# Patient Record
Sex: MALE | Race: WHITE | NOT HISPANIC OR LATINO | URBAN - METROPOLITAN AREA
[De-identification: names, ages, dates, MRNs, and addresses within clinical notes are randomized per-mention and may not be internally consistent; named-entity substitution may affect disease eponyms.]

---

## 2021-09-09 ENCOUNTER — OFFICE VISIT (OUTPATIENT)
Dept: URBAN - METROPOLITAN AREA CLINIC 70 | Facility: CLINIC | Age: 61
End: 2021-09-09
Payer: COMMERCIAL

## 2021-09-09 ENCOUNTER — LAB OUTSIDE AN ENCOUNTER (OUTPATIENT)
Dept: URBAN - METROPOLITAN AREA CLINIC 70 | Facility: CLINIC | Age: 61
End: 2021-09-09

## 2021-09-09 ENCOUNTER — WEB ENCOUNTER (OUTPATIENT)
Dept: URBAN - METROPOLITAN AREA CLINIC 70 | Facility: CLINIC | Age: 61
End: 2021-09-09

## 2021-09-09 DIAGNOSIS — Z12.11 COLON CANCER SCREENING: ICD-10-CM

## 2021-09-09 PROCEDURE — 99203 OFFICE O/P NEW LOW 30 MIN: CPT | Performed by: INTERNAL MEDICINE

## 2021-09-09 RX ORDER — TESTOSTERONE CYPIONATE 200 MG/ML
INJECT 1 2 (ONE HALF) ML (CC) INTRAMUSCULARLY EVERY 10 DAYS FOR LOW TESTOSTERONE INJECTION, SOLUTION INTRAMUSCULAR
Qty: 10 | Refills: 0 | Status: ACTIVE | COMMUNITY

## 2021-09-09 RX ORDER — ATORVASTATIN CALCIUM 80 MG/1
TABLET, FILM COATED ORAL
Qty: 90 | Status: ACTIVE | COMMUNITY

## 2021-09-09 RX ORDER — HYDROCHLOROTHIAZIDE 12.5 MG/1
CAPSULE ORAL
Qty: 90 | Status: ACTIVE | COMMUNITY

## 2021-09-09 RX ORDER — ATORVASTATIN CALCIUM 80 MG/1
1 TABLET TABLET, FILM COATED ORAL ONCE A DAY
Status: ACTIVE | COMMUNITY

## 2021-09-09 RX ORDER — ZOLPIDEM TARTRATE 10 MG/1
TABLET ORAL
Qty: 90 | Status: ACTIVE | COMMUNITY

## 2021-09-09 NOTE — HPI-TODAY'S VISIT:
Patient presents for colon cancer screening. Last colonoscopy was done 2011 with findings of polyp (hyperplastic). No family history of colon cancer. Patient denies any abdominal pain, diarrhea, constipation, weight loss, or rectal bleeding.

## 2021-12-03 ENCOUNTER — OFFICE VISIT (OUTPATIENT)
Dept: URBAN - METROPOLITAN AREA SURGERY CENTER 24 | Facility: SURGERY CENTER | Age: 61
End: 2021-12-03

## 2022-07-11 ENCOUNTER — OFFICE VISIT (OUTPATIENT)
Dept: URBAN - METROPOLITAN AREA TELEHEALTH 2 | Facility: TELEHEALTH | Age: 62
End: 2022-07-11
Payer: COMMERCIAL

## 2022-07-11 ENCOUNTER — LAB OUTSIDE AN ENCOUNTER (OUTPATIENT)
Dept: URBAN - METROPOLITAN AREA TELEHEALTH 2 | Facility: TELEHEALTH | Age: 62
End: 2022-07-11

## 2022-07-11 VITALS — HEIGHT: 72 IN | BODY MASS INDEX: 25.73 KG/M2 | WEIGHT: 190 LBS

## 2022-07-11 DIAGNOSIS — Z12.11 COLON CANCER SCREENING: ICD-10-CM

## 2022-07-11 PROCEDURE — 99442 PHONE E/M BY PHYS 11-20 MIN: CPT | Performed by: INTERNAL MEDICINE

## 2022-07-11 RX ORDER — HYDROCHLOROTHIAZIDE 12.5 MG/1
CAPSULE ORAL
Qty: 90 | Status: ACTIVE | COMMUNITY

## 2022-07-11 RX ORDER — ATORVASTATIN CALCIUM 80 MG/1
1 TABLET TABLET, FILM COATED ORAL ONCE A DAY
Status: ACTIVE | COMMUNITY

## 2022-07-11 RX ORDER — ATORVASTATIN CALCIUM 80 MG/1
TABLET, FILM COATED ORAL
Qty: 90 | Status: ACTIVE | COMMUNITY

## 2022-07-11 RX ORDER — ZOLPIDEM TARTRATE 10 MG/1
TABLET ORAL
Qty: 90 | Status: ACTIVE | COMMUNITY

## 2022-07-11 RX ORDER — TESTOSTERONE CYPIONATE 200 MG/ML
INJECT 1 2 (ONE HALF) ML (CC) INTRAMUSCULARLY EVERY 10 DAYS FOR LOW TESTOSTERONE INJECTION, SOLUTION INTRAMUSCULAR
Qty: 10 | Refills: 0 | Status: ACTIVE | COMMUNITY

## 2022-07-11 NOTE — HPI-TODAY'S VISIT:
OV 9/9/21: Patient presents for colon cancer screening. Last colonoscopy was done 2011 with findings of polyp (hyperplastic). No family history of colon cancer. Patient denies any abdominal pain, diarrhea, constipation, weight loss, or rectal bleeding. ----------------------------------------------------------------------------- Today 7/11/22: Patient presents today to get screening colonoscopy rescheduled. At last OV, procedure was scheduled but had to be cancelled due to insurance issues. No changes in medical condition since last OV per patient. Currently w/o GI complaints or concerns. Denies wt loss, abdominal pain, N/V, diarrhea, constipation, or rectal bleeding.

## 2022-10-31 ENCOUNTER — OFFICE VISIT (OUTPATIENT)
Dept: URBAN - METROPOLITAN AREA SURGERY CENTER 24 | Facility: SURGERY CENTER | Age: 62
End: 2022-10-31
Payer: COMMERCIAL

## 2022-10-31 DIAGNOSIS — Z12.11 COLON CANCER SCREENING: ICD-10-CM

## 2022-10-31 DIAGNOSIS — D12.2 ADENOMA OF ASCENDING COLON: ICD-10-CM

## 2022-10-31 PROCEDURE — 45385 COLONOSCOPY W/LESION REMOVAL: CPT | Performed by: INTERNAL MEDICINE

## 2022-10-31 PROCEDURE — G8907 PT DOC NO EVENTS ON DISCHARG: HCPCS | Performed by: INTERNAL MEDICINE

## 2024-01-10 ENCOUNTER — OFFICE VISIT (OUTPATIENT)
Dept: URBAN - METROPOLITAN AREA CLINIC 70 | Facility: CLINIC | Age: 64
End: 2024-01-10

## 2024-01-17 ENCOUNTER — OFFICE VISIT (OUTPATIENT)
Dept: URBAN - METROPOLITAN AREA CLINIC 70 | Facility: CLINIC | Age: 64
End: 2024-01-17

## 2024-01-17 RX ORDER — HYDROCHLOROTHIAZIDE 12.5 MG/1
CAPSULE ORAL
Qty: 90 | Status: ACTIVE | COMMUNITY

## 2024-01-17 RX ORDER — ATORVASTATIN CALCIUM 80 MG/1
TABLET, FILM COATED ORAL
Qty: 90 | Status: ACTIVE | COMMUNITY

## 2024-01-17 RX ORDER — ATORVASTATIN CALCIUM 80 MG/1
1 TABLET TABLET, FILM COATED ORAL ONCE A DAY
Status: ACTIVE | COMMUNITY

## 2024-01-17 RX ORDER — ZOLPIDEM TARTRATE 10 MG/1
TABLET ORAL
Qty: 90 | Status: ACTIVE | COMMUNITY

## 2024-01-17 RX ORDER — TESTOSTERONE CYPIONATE 200 MG/ML
INJECT 1 2 (ONE HALF) ML (CC) INTRAMUSCULARLY EVERY 10 DAYS FOR LOW TESTOSTERONE INJECTION, SOLUTION INTRAMUSCULAR
Qty: 10 | Refills: 0 | Status: ACTIVE | COMMUNITY

## 2024-01-22 ENCOUNTER — TELEPHONE ENCOUNTER (OUTPATIENT)
Dept: URBAN - METROPOLITAN AREA CLINIC 70 | Facility: CLINIC | Age: 64
End: 2024-01-22

## 2024-02-23 ENCOUNTER — LAB (OUTPATIENT)
Dept: URBAN - METROPOLITAN AREA CLINIC 70 | Facility: CLINIC | Age: 64
End: 2024-02-23

## 2024-02-23 ENCOUNTER — OV EP (OUTPATIENT)
Dept: URBAN - METROPOLITAN AREA CLINIC 70 | Facility: CLINIC | Age: 64
End: 2024-02-23
Payer: COMMERCIAL

## 2024-02-23 VITALS
SYSTOLIC BLOOD PRESSURE: 163 MMHG | HEIGHT: 72 IN | DIASTOLIC BLOOD PRESSURE: 103 MMHG | WEIGHT: 208.8 LBS | HEART RATE: 80 BPM | TEMPERATURE: 98.2 F | BODY MASS INDEX: 28.28 KG/M2

## 2024-02-23 DIAGNOSIS — R10.13 EPIGASTRIC PAIN: ICD-10-CM

## 2024-02-23 DIAGNOSIS — R06.09 DYSPNEA ON EXERTION: ICD-10-CM

## 2024-02-23 DIAGNOSIS — Z86.010 HISTORY OF COLON POLYPS: ICD-10-CM

## 2024-02-23 PROBLEM — 428283002: Status: ACTIVE | Noted: 2024-02-23

## 2024-02-23 PROCEDURE — 99214 OFFICE O/P EST MOD 30 MIN: CPT | Performed by: NURSE PRACTITIONER

## 2024-02-23 RX ORDER — ATORVASTATIN CALCIUM 80 MG/1
1 TABLET TABLET, FILM COATED ORAL ONCE A DAY
Status: ACTIVE | COMMUNITY

## 2024-02-23 RX ORDER — TESTOSTERONE CYPIONATE 200 MG/ML
INJECT 1 2 (ONE HALF) ML (CC) INTRAMUSCULARLY EVERY 10 DAYS FOR LOW TESTOSTERONE INJECTION, SOLUTION INTRAMUSCULAR
Qty: 10 | Refills: 0 | Status: ACTIVE | COMMUNITY

## 2024-02-23 RX ORDER — LISINOPRIL 10 MG/1
1 TABLET TABLET ORAL ONCE A DAY
Status: ACTIVE | COMMUNITY

## 2024-02-23 RX ORDER — ZOLPIDEM TARTRATE 10 MG/1
TABLET ORAL
Qty: 90 | Status: DISCONTINUED | COMMUNITY

## 2024-02-23 RX ORDER — PANTOPRAZOLE SODIUM 40 MG/1
1 TABLET TABLET, DELAYED RELEASE ORAL TWICE A DAY
Qty: 60 TABLET | Refills: 5

## 2024-02-23 RX ORDER — ATORVASTATIN CALCIUM 80 MG/1
TABLET, FILM COATED ORAL
Qty: 90 | Status: DISCONTINUED | COMMUNITY

## 2024-02-23 RX ORDER — HYDROCHLOROTHIAZIDE 12.5 MG/1
CAPSULE ORAL
Qty: 90 | Status: ACTIVE | COMMUNITY

## 2024-02-23 RX ORDER — PANTOPRAZOLE SODIUM 40 MG/1
1 TABLET TABLET, DELAYED RELEASE ORAL ONCE A DAY
Status: ACTIVE | COMMUNITY

## 2024-02-23 NOTE — HPI-TODAY'S VISIT:
Patient is a 64 y/o male who is previously known to our group due to hx of colon polyps with last colonoscopy  10/31/22 with findings of polyp (TA) with recall in 5 years. Today he presents for evaluation of SOB and abdominal pain. He reports recently developing SOB with minimal exertion and is worse with bending over. He has associated substernal/epigastric constant pain described as "tightness". He was started on PPI w/o improvement. He has been evaluated by cardiology ([facility]) with workup negative and is currently undergoing workup with pulmonary (Dr. Andino) with CT pending this am. No prior EGD. No NSAIDs. He was referred to GI to see if etiology was 2/2 GERD. Denies fever, wt loss, N/V, dysphagia, constipation, diarrhea, or signs of GI bleeding.

## 2024-03-25 ENCOUNTER — OV EP (OUTPATIENT)
Dept: URBAN - METROPOLITAN AREA CLINIC 70 | Facility: CLINIC | Age: 64
End: 2024-03-25
Payer: COMMERCIAL

## 2024-03-25 ENCOUNTER — LAB (OUTPATIENT)
Dept: URBAN - METROPOLITAN AREA CLINIC 70 | Facility: CLINIC | Age: 64
End: 2024-03-25

## 2024-03-25 VITALS
TEMPERATURE: 97.6 F | WEIGHT: 209.4 LBS | BODY MASS INDEX: 28.36 KG/M2 | HEART RATE: 89 BPM | SYSTOLIC BLOOD PRESSURE: 138 MMHG | DIASTOLIC BLOOD PRESSURE: 91 MMHG | HEIGHT: 72 IN

## 2024-03-25 DIAGNOSIS — R10.13 EPIGASTRIC PAIN: ICD-10-CM

## 2024-03-25 DIAGNOSIS — Z86.010 HISTORY OF COLON POLYPS: ICD-10-CM

## 2024-03-25 DIAGNOSIS — R06.09 DYSPNEA ON EXERTION: ICD-10-CM

## 2024-03-25 PROCEDURE — 99214 OFFICE O/P EST MOD 30 MIN: CPT | Performed by: NURSE PRACTITIONER

## 2024-03-25 RX ORDER — PANTOPRAZOLE SODIUM 40 MG/1
1 TABLET TABLET, DELAYED RELEASE ORAL TWICE A DAY
Qty: 60 TABLET | Refills: 5 | Status: ACTIVE | COMMUNITY

## 2024-03-25 RX ORDER — LISINOPRIL 10 MG/1
1 TABLET TABLET ORAL ONCE A DAY
Status: ACTIVE | COMMUNITY

## 2024-03-25 RX ORDER — HYDROCHLOROTHIAZIDE 12.5 MG/1
CAPSULE ORAL
Qty: 90 | Status: ON HOLD | COMMUNITY

## 2024-03-25 RX ORDER — PANTOPRAZOLE SODIUM 40 MG/1
1 TABLET TABLET, DELAYED RELEASE ORAL TWICE A DAY
Qty: 60 TABLET | Refills: 5 | OUTPATIENT

## 2024-03-25 RX ORDER — ATORVASTATIN CALCIUM 80 MG/1
1 TABLET TABLET, FILM COATED ORAL ONCE A DAY
Status: ACTIVE | COMMUNITY

## 2024-03-25 RX ORDER — TESTOSTERONE CYPIONATE 200 MG/ML
INJECT 1 2 (ONE HALF) ML (CC) INTRAMUSCULARLY EVERY 10 DAYS FOR LOW TESTOSTERONE INJECTION, SOLUTION INTRAMUSCULAR
Qty: 10 | Refills: 0 | Status: ON HOLD | COMMUNITY

## 2024-03-25 NOTE — HPI-TODAY'S VISIT:
OV 2/23/24: Patient is a 64 y/o male who is previously known to our group due to hx of colon polyps with last colonoscopy  10/31/22 with findings of polyp (TA) with recall in 5 years. Today he presents for evaluation of SOB and abdominal pain. He reports recently developing SOB with minimal exertion and is worse with bending over. He has associated substernal/epigastric constant pain described as "tightness". He was started on PPI w/o improvement. He has been evaluated by cardiology ([facility]) with workup negative and is currently undergoing workup with pulmonary (Dr. Andino) with CT pending this am. No prior EGD. No NSAIDs. He was referred to GI to see if etiology was 2/2 GERD. Denies fever, wt loss, N/V, dysphagia, constipation, diarrhea, or signs of GI bleeding. ---------------------- Today 3/25/24: Patient presents today for follow up. He has been on high dose PPI since last OV. Undewent CT of chest/abdomen/pelvis 2/23/24 by pulmonary with negative results. Pulmonary workup negative. Admits to high stress due to a new job he took ~ 3 years ago. Has continued epigastric pressure with SOB only when he bends over. No new GI complaints.

## 2024-03-26 ENCOUNTER — EGD (OUTPATIENT)
Dept: URBAN - METROPOLITAN AREA SURGERY CENTER 24 | Facility: SURGERY CENTER | Age: 64
End: 2024-03-26

## 2024-03-26 RX ORDER — TESTOSTERONE CYPIONATE 200 MG/ML
INJECT 1 2 (ONE HALF) ML (CC) INTRAMUSCULARLY EVERY 10 DAYS FOR LOW TESTOSTERONE INJECTION, SOLUTION INTRAMUSCULAR
Qty: 10 | Refills: 0 | Status: ON HOLD | COMMUNITY

## 2024-03-26 RX ORDER — HYDROCHLOROTHIAZIDE 12.5 MG/1
CAPSULE ORAL
Qty: 90 | Status: ON HOLD | COMMUNITY

## 2024-03-26 RX ORDER — LISINOPRIL 10 MG/1
1 TABLET TABLET ORAL ONCE A DAY
Status: ACTIVE | COMMUNITY

## 2024-03-26 RX ORDER — PANTOPRAZOLE SODIUM 40 MG/1
1 TABLET TABLET, DELAYED RELEASE ORAL TWICE A DAY
Qty: 60 TABLET | Refills: 5 | Status: ACTIVE | COMMUNITY

## 2024-03-26 RX ORDER — ATORVASTATIN CALCIUM 80 MG/1
1 TABLET TABLET, FILM COATED ORAL ONCE A DAY
Status: ACTIVE | COMMUNITY

## 2024-04-23 ENCOUNTER — OV EP (OUTPATIENT)
Dept: URBAN - METROPOLITAN AREA CLINIC 70 | Facility: CLINIC | Age: 64
End: 2024-04-23

## 2024-05-03 ENCOUNTER — DASHBOARD ENCOUNTERS (OUTPATIENT)
Age: 64
End: 2024-05-03

## 2024-05-03 ENCOUNTER — OFFICE VISIT (OUTPATIENT)
Dept: URBAN - METROPOLITAN AREA CLINIC 70 | Facility: CLINIC | Age: 64
End: 2024-05-03
Payer: COMMERCIAL

## 2024-05-03 VITALS
DIASTOLIC BLOOD PRESSURE: 96 MMHG | HEIGHT: 72 IN | SYSTOLIC BLOOD PRESSURE: 145 MMHG | BODY MASS INDEX: 28.12 KG/M2 | TEMPERATURE: 98.1 F | HEART RATE: 77 BPM | WEIGHT: 207.6 LBS

## 2024-05-03 DIAGNOSIS — Z86.010 HISTORY OF COLON POLYPS: ICD-10-CM

## 2024-05-03 DIAGNOSIS — R10.13 EPIGASTRIC PAIN: ICD-10-CM

## 2024-05-03 DIAGNOSIS — R06.09 DYSPNEA ON EXERTION: ICD-10-CM

## 2024-05-03 DIAGNOSIS — K21.9 GASTROESOPHAGEAL REFLUX DISEASE WITHOUT ESOPHAGITIS: ICD-10-CM

## 2024-05-03 PROBLEM — 266435005: Status: ACTIVE | Noted: 2024-05-03

## 2024-05-03 PROCEDURE — 99214 OFFICE O/P EST MOD 30 MIN: CPT | Performed by: NURSE PRACTITIONER

## 2024-05-03 RX ORDER — ATORVASTATIN CALCIUM 80 MG/1
1 TABLET TABLET, FILM COATED ORAL ONCE A DAY
Status: ACTIVE | COMMUNITY

## 2024-05-03 RX ORDER — PANTOPRAZOLE SODIUM 40 MG/1
1 TABLET TABLET, DELAYED RELEASE ORAL ONCE A DAY
OUTPATIENT

## 2024-05-03 RX ORDER — HYDROCHLOROTHIAZIDE 12.5 MG/1
CAPSULE ORAL
Qty: 90 | Status: ON HOLD | COMMUNITY

## 2024-05-03 RX ORDER — PANTOPRAZOLE SODIUM 40 MG/1
1 TABLET TABLET, DELAYED RELEASE ORAL TWICE A DAY
Qty: 60 TABLET | Refills: 5 | Status: ACTIVE | COMMUNITY

## 2024-05-03 RX ORDER — TESTOSTERONE CYPIONATE 200 MG/ML
INJECT 1 2 (ONE HALF) ML (CC) INTRAMUSCULARLY EVERY 10 DAYS FOR LOW TESTOSTERONE INJECTION, SOLUTION INTRAMUSCULAR
Qty: 10 | Refills: 0 | Status: ON HOLD | COMMUNITY

## 2024-05-03 RX ORDER — LISINOPRIL 10 MG/1
1 TABLET TABLET ORAL ONCE A DAY
Status: ACTIVE | COMMUNITY